# Patient Record
Sex: MALE | URBAN - METROPOLITAN AREA
[De-identification: names, ages, dates, MRNs, and addresses within clinical notes are randomized per-mention and may not be internally consistent; named-entity substitution may affect disease eponyms.]

---

## 2019-12-10 ENCOUNTER — APPOINTMENT (RX ONLY)
Dept: URBAN - METROPOLITAN AREA CLINIC 45 | Facility: CLINIC | Age: 79
Setting detail: DERMATOLOGY
End: 2019-12-10

## 2019-12-10 PROBLEM — D04.21 CARCINOMA IN SITU OF SKIN OF RIGHT EAR AND EXTERNAL AURICULAR CANAL: Status: ACTIVE | Noted: 2019-12-10

## 2019-12-10 PROCEDURE — 17311 MOHS 1 STAGE H/N/HF/G: CPT

## 2019-12-10 PROCEDURE — 13152 CMPLX RPR E/N/E/L 2.6-7.5 CM: CPT

## 2019-12-10 PROCEDURE — ? MOHS SURGERY

## 2019-12-10 NOTE — PROCEDURE: MOHS SURGERY
Stage 14: Additional Anesthesia Type: 1% lidocaine with epinephrine and a 1:10 solution of 8.4% sodium bicarbonate

## 2020-03-18 ENCOUNTER — APPOINTMENT (RX ONLY)
Dept: URBAN - METROPOLITAN AREA CLINIC 45 | Facility: CLINIC | Age: 80
Setting detail: DERMATOLOGY
End: 2020-03-18

## 2020-03-18 PROBLEM — C44.329 SQUAMOUS CELL CARCINOMA OF SKIN OF OTHER PARTS OF FACE: Status: ACTIVE | Noted: 2020-03-18

## 2020-03-18 PROCEDURE — ? MOHS SURGERY

## 2020-03-18 PROCEDURE — 14041 TIS TRNFR F/C/C/M/N/A/G/H/F: CPT

## 2020-03-18 PROCEDURE — 17311 MOHS 1 STAGE H/N/HF/G: CPT

## 2020-03-18 PROCEDURE — 17312 MOHS ADDL STAGE: CPT

## 2022-03-09 ENCOUNTER — APPOINTMENT (RX ONLY)
Dept: URBAN - METROPOLITAN AREA CLINIC 45 | Facility: CLINIC | Age: 82
Setting detail: DERMATOLOGY
End: 2022-03-09

## 2022-03-09 PROBLEM — C44.311 BASAL CELL CARCINOMA OF SKIN OF NOSE: Status: ACTIVE | Noted: 2022-03-09

## 2022-03-09 PROCEDURE — 14061 TIS TRNFR E/N/E/L10.1-30SQCM: CPT

## 2022-03-09 PROCEDURE — 17311 MOHS 1 STAGE H/N/HF/G: CPT

## 2022-03-09 PROCEDURE — ? MOHS SURGERY

## 2022-03-09 NOTE — PROCEDURE: MOHS SURGERY
Addended by: JOHN ESCOBAR on: 7/7/2020 03:39 PM     Modules accepted: Orders     Paramedian Forehead Flap Text: We discussed various closure modalities with the patient, including healing by second intention, primary closure, skin graft and various flaps.  The location and configuration of the defect indicated that a paramedian forehead (interpolation) flap would result in the least disturbance of the position and function of the surrounding anatomic structures, and provide the best result.  The technique, its benefits, alternatives and risks were discussed with the patient.  The patient underwent the procedure as follows: The patient was positioned supine on the operating room table.  The area of the defect and the surrounding skin were anesthetized with buffered 1% lidocaine with epinephrine.  The area was washed with chlorhexidine.  Sterile drapes were applied. \\n\\nA telfa template was made of the defect.  This telfa template was then used to outline the paramedian forehead pedicle flap.  The flap was excised through the skin and subcutaneous tissue down to the layer of the underlying musculature.  The pedicle flap was carefully excised within this deep plane to maintain its blood supply.  The edges of the donor site were undermined.   The donor site was closed in a primary fashion.  The pedicle was then rotated into position and sutured.  Once the tube was sutured into place, adequate blood supply was confirmed with blanching and refill.  The pedicle was then wrapped with xeroform gauze and dressed appropriately with a telfa and gauze bandage to ensure continued blood supply and protect the attached pedicle.  The second stage of the flap (takedown) would occur after sufficient blood supply has been established, at about 3 weeks.

## 2022-03-16 ENCOUNTER — APPOINTMENT (RX ONLY)
Dept: URBAN - METROPOLITAN AREA CLINIC 45 | Facility: CLINIC | Age: 82
Setting detail: DERMATOLOGY
End: 2022-03-16

## 2022-03-16 DIAGNOSIS — Z48.817 ENCOUNTER FOR SURGICAL AFTERCARE FOLLOWING SURGERY ON THE SKIN AND SUBCUTANEOUS TISSUE: ICD-10-CM

## 2022-03-16 PROCEDURE — 99024 POSTOP FOLLOW-UP VISIT: CPT

## 2022-03-16 PROCEDURE — ? POST-OP WOUND CHECK

## 2022-03-16 ASSESSMENT — LOCATION DETAILED DESCRIPTION DERM: LOCATION DETAILED: NASAL TIP

## 2022-03-16 ASSESSMENT — LOCATION ZONE DERM: LOCATION ZONE: NOSE

## 2022-03-16 ASSESSMENT — LOCATION SIMPLE DESCRIPTION DERM: LOCATION SIMPLE: NOSE

## 2022-03-16 NOTE — PROCEDURE: POST-OP WOUND CHECK
Detail Level: Detailed
Add 24064 Cpt? (Important Note: In 2017 The Use Of 61361 Is Being Tracked By Cms To Determine Future Global Period Reimbursement For Global Periods): yes
Wound Dressing Override (Optional): Aquaphor and/or dressing placed as appropriate

## 2022-03-23 ENCOUNTER — APPOINTMENT (RX ONLY)
Dept: URBAN - METROPOLITAN AREA CLINIC 45 | Facility: CLINIC | Age: 82
Setting detail: DERMATOLOGY
End: 2022-03-23

## 2022-03-23 DIAGNOSIS — Z48.817 ENCOUNTER FOR SURGICAL AFTERCARE FOLLOWING SURGERY ON THE SKIN AND SUBCUTANEOUS TISSUE: ICD-10-CM | Status: WELL CONTROLLED

## 2022-03-23 PROBLEM — D04.39 CARCINOMA IN SITU OF SKIN OF OTHER PARTS OF FACE: Status: ACTIVE | Noted: 2022-03-23

## 2022-03-23 PROCEDURE — ? POST-OP WOUND CHECK

## 2022-03-23 PROCEDURE — 99024 POSTOP FOLLOW-UP VISIT: CPT

## 2022-03-23 PROCEDURE — ? MOHS SURGERY

## 2022-03-23 PROCEDURE — 17311 MOHS 1 STAGE H/N/HF/G: CPT | Mod: 79

## 2022-03-23 PROCEDURE — 13132 CMPLX RPR F/C/C/M/N/AX/G/H/F: CPT | Mod: 79

## 2022-03-23 ASSESSMENT — LOCATION SIMPLE DESCRIPTION DERM: LOCATION SIMPLE: NOSE

## 2022-03-23 ASSESSMENT — LOCATION DETAILED DESCRIPTION DERM: LOCATION DETAILED: NASAL TIP

## 2022-03-23 ASSESSMENT — LOCATION ZONE DERM: LOCATION ZONE: NOSE

## 2022-03-23 NOTE — PROCEDURE: POST-OP WOUND CHECK
Add 10546 Cpt? (Important Note: In 2017 The Use Of 56370 Is Being Tracked By Cms To Determine Future Global Period Reimbursement For Global Periods): yes
Detail Level: Detailed
Wound Dressing Override (Optional): Aquaphor and/or dressing placed as appropriate

## 2022-04-12 ENCOUNTER — APPOINTMENT (RX ONLY)
Dept: URBAN - METROPOLITAN AREA CLINIC 45 | Facility: CLINIC | Age: 82
Setting detail: DERMATOLOGY
End: 2022-04-12

## 2022-04-12 PROBLEM — C44.329 SQUAMOUS CELL CARCINOMA OF SKIN OF OTHER PARTS OF FACE: Status: ACTIVE | Noted: 2022-04-12

## 2022-04-12 PROCEDURE — 17311 MOHS 1 STAGE H/N/HF/G: CPT | Mod: 79

## 2022-04-12 PROCEDURE — ? MOHS SURGERY

## 2022-04-12 PROCEDURE — 13132 CMPLX RPR F/C/C/M/N/AX/G/H/F: CPT | Mod: 79

## 2022-04-12 PROCEDURE — ? PRESCRIPTION

## 2022-04-12 PROCEDURE — ? MEDICATION COUNSELING

## 2022-04-12 RX ORDER — DOXYCYCLINE HYCLATE 100 MG/1
1 TABLET, COATED ORAL BID
Qty: 10 | Refills: 0 | Status: ERX | COMMUNITY
Start: 2022-04-12

## 2022-04-12 RX ADMIN — DOXYCYCLINE HYCLATE 1: 100 TABLET, COATED ORAL at 00:00

## 2022-04-12 NOTE — PROCEDURE: MEDICATION COUNSELING
Spoke with patient regarding subtherapeutic dilantin level.  He denies any missed doses.  Also denies any breakthrough seizures or seizure-like activity.  He would not like to increase the dose at this time as it makes him very tired throughout the day.  Advised patient to contact the office if any breakthrough seizures or seizure-like activity occurs.  Patient voiced understanding.   Calcipotriene Pregnancy And Lactation Text: This medication has not been proven safe during pregnancy. It is unknown if this medication is excreted in breast milk.

## 2022-04-12 NOTE — PROCEDURE: MOHS SURGERY
Anticoagulation Summary  As of 2022    INR goal:  1.5-2.5   TTR:  75.0 % (1.9 y)   INR used for dosin.6 (2022)   Warfarin maintenance plan:  5 mg (5 mg x 1) every Tu; 7.5 mg (5 mg x 1.5) all other days   Weekly warfarin total:  50 mg   No change documented:  Shelly Watson RN   Plan last modified:  Shelly Watson RN (2022)   Next INR check:  2022   Target end date:           Anticoagulation Episode Summary     INR check location:      Preferred lab:      Send INR reminders to:  [facility] NURSE MSG POOL    Comments:             Mart-1 - Negative Histology Text: MART-1 staining demonstrates a normal density and pattern of melanocytes along the dermal-epidermal junction. The surgical margins are negative for tumor cells.

## 2022-04-12 NOTE — PROCEDURE: MEDICATION COUNSELING
Xelanne mariez Pregnancy And Lactation Text: This medication is Pregnancy Category D and is not considered safe during pregnancy.  The risk during breast feeding is also uncertain.

## 2022-04-12 NOTE — PROCEDURE: MOHS SURGERY
CPAP Device Patient Instruction    Tio Hussein   1966     LAURIE Ye has ordered a CPAP Device.    Indicated below is who you have approved as your Durable Medical Equipment (DME) provider:      [facility]  Phone: [phone] or [phone]  Address: [address] [place] [address]  Website:  [email or web address]     · The durable medical equipment (DME) company you have chosen as your provider will call you to set up a day and location for you to  your CPAP machine.  You will select a mask, learn how to use the machine and learn how to do a download.  · A download is completed:    1. Modem: Please call your supplier 10 days prior to your appointment.  They will then retrieve the data report from the machine and fax it to our office for review.    · This downloaded report will indicate:  1. How much you are using the CPAP machine   2. If your apnea is controlled  3. If your mask is leaking    Please make sure to keep this DME provider information on hand for any future questions or concerns with your CPAP device.      Please know when your insurance will approve replacement supplies. You will need to call your supplier for new supplies at that time.      FOLLOW UP:   · According to insurance guidelines, a follow up appointment after using the device is required within a certain timeframe. Please make sure to keep that appointment.    · If you forget to have your CPAP data downloaded, be sure to keep your appointment and complete the download as soon as possible.   · Follow up appointments and download reports are needed to monitor your health and sleep apnea.  Insurance companies also request this information for compliance.      If you have any questions, please call my office at [phone].        CPAP / BiPAP Care and Cleaning Instructions    Follow the ’s recommendations.  Do not take your mask apart unless you know how to put it back together.     Mask:  Clean daily with a damp washcloth.  Clean weekly with water and baby shampoo or other mild soap.     Hose/Tubing: Clean weekly with water and baby shampoo or other mild soap.  Hang over a shower carloz to dry.     Humidifier chamber: Fill nightly with distilled water.  Every morning, empty the excess water and let the chamber air dry.  Clean weekly with above.     Air filter: A white filter should be removed weekly and the dust flicked off with your fingers.  Do not wash the white filter.  Replace when unable to flick away dust.  If you have a black foam filter, you can wash that with mild soap and water weekly.      All of the above parts are REPLACEABLE and it is recommended to change them out routinely!  Overtime, dirt and oils from your skin can soften the cushion, making it so it doesn't hold a tight seal anymore with your face.  It also affects hygiene.  This creates air leaks that can affect how effective your CPAP therapy is.  Call the company that provided your CPAP/BiPAP machine for help with ordering. Some parts are replaceable monthly and others every 3-6 months.  Ask, and then channing your calendar.  When due, they will be covered by insurance.      Schedule below is typical for most insurances:   Full-Face Cushion: 1 per month  Disposable Filter: 2 per month   Replaceable Pillow or Cushions: 2 per month   Mask: 1 every 3 months  Tubin every 3 months  Non-Disposable Filter: 1 every 6 months   Headgear: 1 every 6 months  Disposable Heated Humidification Chamber: 1 every 6 months  Chin Strap: 1 every 6 months     If you are having problems using your CPAP/BiPAP device due to discomfort, congestion, air leak, etc. please call the office for help.  Do NOT just stop wearing your device.  It is important for your health!             Nasalis-Muscle-Based Myocutaneous Island Pedicle Flap Text: Using a #15 blade, an incision was made around the donor flap to the level of the nasalis muscle. Wide lateral undermining was then performed in both the subcutaneous plane above the nasalis muscle, and in a submuscular plane just above periosteum. This allowed the formation of a free nasalis muscle axial pedicle (based on the angular artery) which was still attached to the actual cutaneous flap, increasing its mobility and vascular viability. Hemostasis was obtained with pinpoint electrocoagulation. The flap was mobilized and tunnelled into position and the pivotal anchor points positioned and stabilized with buried interrupted sutures. Subcutaneous and dermal tissues were closed in a multilayered fashion with sutures. Tissue redundancies were excised, and the epidermal edges were apposed without significant tension and sutured with sutures.  The primary and secondary sites were closed with subcutaneous, dermal and epidermal sutures.

## 2022-04-12 NOTE — PROCEDURE: MOHS SURGERY
Patient c/o severe pain in my left lower leg. Patient believes its a blood clot. Patient has history of clots. Patient denies any chest pain or shortness of breath. Incidental Superficial Basal Cell Carcinoma Histology Text: Incidentally, a superficial basal cell carcinoma demonstrating hyperchromatic nuclei and peripheral palasading is seen within the epidermis.

## 2022-04-14 ENCOUNTER — APPOINTMENT (RX ONLY)
Dept: URBAN - METROPOLITAN AREA CLINIC 45 | Facility: CLINIC | Age: 82
Setting detail: DERMATOLOGY
End: 2022-04-14

## 2022-04-14 PROBLEM — C44.712 BASAL CELL CARCINOMA OF SKIN OF RIGHT LOWER LIMB, INCLUDING HIP: Status: ACTIVE | Noted: 2022-04-14

## 2022-04-14 PROCEDURE — 13121 CMPLX RPR S/A/L 2.6-7.5 CM: CPT | Mod: 79

## 2022-04-14 PROCEDURE — ? MOHS SURGERY

## 2022-04-14 PROCEDURE — 17313 MOHS 1 STAGE T/A/L: CPT | Mod: 79

## 2022-04-14 NOTE — PROCEDURE: MOHS SURGERY
07-Aug-2019 00:00 Bcc Histology Text: There were aggregates of hyperchromatic basaloid cells present with peripheral palisading and retraction within fibromyxoid stroma.  The area of positive cancer is as marked on the Mohs map.

## 2022-04-14 NOTE — PROCEDURE: MOHS SURGERY
-see above    Ear Star Wedge Flap Text: The defect edges were debeveled with a #15 blade scalpel.  Given the location of the defect and the proximity to free margins (helical rim) an ear star wedge flap was deemed most appropriate.  Using a sterile surgical marker, the appropriate flap was drawn incorporating the defect and placing the expected incisions between the helical rim and antihelix where possible.  The area thus outlined was incised through and through with a #15 scalpel blade.

## 2022-04-19 ENCOUNTER — APPOINTMENT (RX ONLY)
Dept: URBAN - METROPOLITAN AREA CLINIC 45 | Facility: CLINIC | Age: 82
Setting detail: DERMATOLOGY
End: 2022-04-19

## 2022-04-19 PROBLEM — C44.319 BASAL CELL CARCINOMA OF SKIN OF OTHER PARTS OF FACE: Status: ACTIVE | Noted: 2022-04-19

## 2022-04-19 PROCEDURE — 13132 CMPLX RPR F/C/C/M/N/AX/G/H/F: CPT | Mod: 79

## 2022-04-19 PROCEDURE — 17312 MOHS ADDL STAGE: CPT | Mod: 79

## 2022-04-19 PROCEDURE — ? MOHS SURGERY

## 2022-04-19 PROCEDURE — 17311 MOHS 1 STAGE H/N/HF/G: CPT | Mod: 79

## 2022-04-25 ENCOUNTER — APPOINTMENT (RX ONLY)
Dept: URBAN - METROPOLITAN AREA CLINIC 45 | Facility: CLINIC | Age: 82
Setting detail: DERMATOLOGY
End: 2022-04-25

## 2022-04-25 PROBLEM — D04.39 CARCINOMA IN SITU OF SKIN OF OTHER PARTS OF FACE: Status: ACTIVE | Noted: 2022-04-25

## 2022-04-25 PROCEDURE — 17311 MOHS 1 STAGE H/N/HF/G: CPT | Mod: 79

## 2022-04-25 PROCEDURE — ? MOHS SURGERY

## 2022-04-25 PROCEDURE — 13132 CMPLX RPR F/C/C/M/N/AX/G/H/F: CPT | Mod: 79

## 2022-04-25 NOTE — PROCEDURE: MOHS SURGERY
Chuy Hollis Jr. was admitted to [facility] from another hospital via cart accompanied by Other EMS.   Reason for hospitalization is Chest pain and planned nephrectomy on 10/28.   Upon arrival, patient is stable. Patient has history significant for HTN, CKD.  Patient oriented to bed, call light, , room and unit.  Patient provided with the following educational materials upon admission:safety, advanced directives and infection control.   Level of understanding patient verbalized understanding.   Admission orders received at this time.  MD notified of patient arrival.   See Epic documentation for patient individualized nursing care plan.   O-T Advancement Flap Text: We discussed various closure modalities with the patient, including healing by second intention, primary closure, skin graft and various flaps.  The location and configuration of the defect indicated that an O-T advancement flap would result in the least disturbance of the position and function of the surrounding anatomic structures, and provide the best result.  The technique, its benefits, alternatives and risks were discussed with the patient.  The patient underwent the procedure as follows: The patient was positioned supine on the operating room table.  The area of the defect and the surrounding skin were anesthetized with buffered 1% lidocaine with epinephrine.  The area was washed with chlorhexidine.  Sterile drapes were applied. \\n\\nThe wound edges were debeveled and extensively undermined.  A O-T advancement flap was designed, incised, and elevated.  Hemostasis was achieved with spot electrodesiccation.  The flap was advanced into position to cover the primary defect and a key suture was used to secure the flap into place.  Additional buried interrupted sutures were used to close the arms of the flap by the rule of halves to share out the unequal lengths.  The standing cones so created by the design of the flap was removed to fat by triangulation.  Final cutaneous approximation was achieved.  The closure was manually tested and found to be sound for strength and hemostasis.

## 2022-04-30 ENCOUNTER — TELEPHONE ENCOUNTER (OUTPATIENT)
Dept: URBAN - METROPOLITAN AREA CLINIC 121 | Facility: CLINIC | Age: 82
End: 2022-04-30

## 2022-04-30 RX ORDER — LOSARTAN POTASSIUM 50 MG/1
QD TABLET, FILM COATED ORAL
OUTPATIENT
Start: 2013-12-06

## 2022-04-30 RX ORDER — GLYBURIDE 5 MG/1
QD TABLET ORAL
OUTPATIENT
Start: 2013-12-06 | End: 2014-09-24

## 2022-04-30 RX ORDER — PANTOPRAZOLE SODIUM 40 MG/1
1 TABLET PO BID TABLET, DELAYED RELEASE ORAL
OUTPATIENT
Start: 2013-12-06

## 2022-04-30 RX ORDER — SUCRALFATE 1 G/10ML
QID SUSPENSION ORAL
OUTPATIENT
Start: 2013-12-06 | End: 2014-09-24

## 2022-04-30 RX ORDER — MULTIVITAMIN
TABLET ORAL
OUTPATIENT
Start: 2006-02-23

## 2022-04-30 RX ORDER — GLYBURIDE AND METFORMIN HYDROCHLORIDE 2.5; 5 MG/1; MG/1
TABLET, FILM COATED ORAL
OUTPATIENT
Start: 2006-02-23

## 2022-04-30 RX ORDER — METFORMIN HCL 500 MG/1
BID TABLET ORAL
OUTPATIENT
Start: 2013-12-06

## 2022-04-30 RX ORDER — MULTIVITAMIN
TABLET ORAL
OUTPATIENT
Start: 2006-02-23 | End: 2013-12-06

## 2022-04-30 RX ORDER — ASPIRIN 81 MG/1
TABLET ORAL
OUTPATIENT
Start: 2006-02-23 | End: 2013-12-06

## 2022-04-30 RX ORDER — SUCRALFATE 1 G/10ML
QID SUSPENSION ORAL
OUTPATIENT
Start: 2013-12-06

## 2022-04-30 RX ORDER — PANTOPRAZOLE SODIUM 40 MG/1
1 TABLET PO BID TABLET, DELAYED RELEASE ORAL
OUTPATIENT
Start: 2013-12-06 | End: 2014-09-24

## 2022-04-30 RX ORDER — ASPIRIN 81 MG/1
TABLET ORAL
OUTPATIENT
Start: 2006-02-23

## 2022-04-30 RX ORDER — GLYBURIDE 5 MG/1
QD TABLET ORAL
OUTPATIENT
Start: 2013-12-06

## 2022-05-01 ENCOUNTER — TELEPHONE ENCOUNTER (OUTPATIENT)
Dept: URBAN - METROPOLITAN AREA CLINIC 121 | Facility: CLINIC | Age: 82
End: 2022-05-01

## 2022-05-01 RX ORDER — METFORMIN HCL 500 MG/1
BID TABLET ORAL
Status: ACTIVE | COMMUNITY
Start: 2014-09-24

## 2022-05-01 RX ORDER — ASPIRIN 81 MG
TABLET, DELAYED RELEASE (ENTERIC COATED) ORAL
Status: ACTIVE | COMMUNITY
Start: 2017-06-26

## 2022-05-01 RX ORDER — OMEPRAZOLE MAGNESIUM 20.6 MG/1
QD CAPSULE, DELAYED RELEASE ORAL
Status: ACTIVE | COMMUNITY
Start: 2014-09-24

## 2022-05-01 RX ORDER — LOSARTAN POTASSIUM 100 MG/1
QD TABLET, FILM COATED ORAL
Status: ACTIVE | COMMUNITY
Start: 2014-09-24

## 2022-05-01 RX ORDER — MULTIVIT-MINERALS/FA/LYCOPENE 0.4 MG-6
TABLET ORAL
Status: ACTIVE | COMMUNITY
Start: 2014-09-24

## 2022-05-01 RX ORDER — NIFEDIPINE 90 MG/1
QD TABLET, EXTENDED RELEASE ORAL
Status: ACTIVE | COMMUNITY
Start: 2014-09-24

## 2022-05-02 ENCOUNTER — APPOINTMENT (RX ONLY)
Dept: URBAN - METROPOLITAN AREA CLINIC 45 | Facility: CLINIC | Age: 82
Setting detail: DERMATOLOGY
End: 2022-05-02

## 2022-05-02 DIAGNOSIS — Z48.817 ENCOUNTER FOR SURGICAL AFTERCARE FOLLOWING SURGERY ON THE SKIN AND SUBCUTANEOUS TISSUE: ICD-10-CM

## 2022-05-02 DIAGNOSIS — L90.5 SCAR CONDITIONS AND FIBROSIS OF SKIN: ICD-10-CM

## 2022-05-02 PROCEDURE — ? POST-OP WOUND CHECK

## 2022-05-02 PROCEDURE — 99024 POSTOP FOLLOW-UP VISIT: CPT

## 2022-05-02 ASSESSMENT — LOCATION SIMPLE DESCRIPTION DERM
LOCATION SIMPLE: RIGHT FOREHEAD
LOCATION SIMPLE: RIGHT PRETIBIAL REGION

## 2022-05-02 ASSESSMENT — LOCATION ZONE DERM
LOCATION ZONE: LEG
LOCATION ZONE: FACE

## 2022-05-02 ASSESSMENT — LOCATION DETAILED DESCRIPTION DERM
LOCATION DETAILED: RIGHT SUPERIOR FOREHEAD
LOCATION DETAILED: RIGHT PROXIMAL PRETIBIAL REGION

## 2022-05-02 NOTE — PROCEDURE: POST-OP WOUND CHECK
Add 28049 Cpt? (Important Note: In 2017 The Use Of 33145 Is Being Tracked By Cms To Determine Future Global Period Reimbursement For Global Periods): yes
Detail Level: Detailed

## 2022-11-15 ENCOUNTER — DASHBOARD ENCOUNTERS (OUTPATIENT)
Age: 82
End: 2022-11-15

## 2022-11-15 ENCOUNTER — OFFICE VISIT (OUTPATIENT)
Dept: URBAN - METROPOLITAN AREA CLINIC 27 | Facility: CLINIC | Age: 82
End: 2022-11-15
Payer: MEDICARE

## 2022-11-15 DIAGNOSIS — K63.5 POLYP OF COLON: ICD-10-CM

## 2022-11-15 DIAGNOSIS — E11.9 TYPE 2 DIABETES MELLITUS WITHOUT COMPLICATIONS: ICD-10-CM

## 2022-11-15 DIAGNOSIS — I10 ESSENTIAL (PRIMARY) HYPERTENSION: ICD-10-CM

## 2022-11-15 PROBLEM — 68496003 POLYP OF COLON: Status: ACTIVE | Noted: 2022-11-15

## 2022-11-15 PROCEDURE — 99203 OFFICE O/P NEW LOW 30 MIN: CPT | Performed by: INTERNAL MEDICINE

## 2022-11-15 PROCEDURE — 99242 OFF/OP CONSLTJ NEW/EST SF 20: CPT | Performed by: INTERNAL MEDICINE

## 2022-11-15 RX ORDER — MULTIVIT-MINERALS/FA/LYCOPENE 0.4 MG-6
TABLET ORAL
Status: ACTIVE | COMMUNITY
Start: 2014-09-24

## 2022-11-15 RX ORDER — ASPIRIN 81 MG
TABLET, DELAYED RELEASE (ENTERIC COATED) ORAL
Status: ACTIVE | COMMUNITY
Start: 2017-06-26

## 2022-11-15 RX ORDER — LOSARTAN POTASSIUM 100 MG/1
QD TABLET, FILM COATED ORAL
Status: ACTIVE | COMMUNITY
Start: 2014-09-24

## 2022-11-15 RX ORDER — METFORMIN HCL 500 MG/1
BID TABLET ORAL
Status: ACTIVE | COMMUNITY
Start: 2014-09-24

## 2022-11-15 RX ORDER — NIFEDIPINE 90 MG/1
QD TABLET, EXTENDED RELEASE ORAL
Status: ACTIVE | COMMUNITY
Start: 2014-09-24

## 2022-11-15 RX ORDER — OMEPRAZOLE MAGNESIUM 20.6 MG/1
QD CAPSULE, DELAYED RELEASE ORAL
Status: ACTIVE | COMMUNITY
Start: 2014-09-24

## 2022-11-15 NOTE — HPI-TODAY'S VISIT:
This is a 82-year-old male seen in consultation today for colon cancer screening evaluation.  He has a history of sessile serrated adenoma and tubular adenomas.  Last colonoscopy in 2017 with benign polyps.  He has multiple myeloma.  He has MARCELO with a creatinine of 1.9.  He is followed by nephrology and oncology.  He wants to discuss follow-up and surveillance colonoscopy but has no current GI issues.  No worsening anemia or other changes

## 2023-03-28 NOTE — PROCEDURE: MOHS SURGERY
[facility] Body Location Override (Optional - Billing Will Still Be Based On Selected Body Map Location If Applicable): right superior posterior helix

## 2023-04-21 NOTE — PROCEDURE: MOHS SURGERY
Never Mid-Level Procedure Text (A): After obtaining clear surgical margins the patient was sent to a mid-level provider for surgical repair.  The patient understands they will receive post-surgical care and follow-up from the mid-level provider.

## 2023-12-04 NOTE — PROCEDURE: MOHS SURGERY
----- Message from Damion Rae MD sent at 12/4/2023  4:35 PM CST -----  Ultrasound reviewed the liver is normal in size, gallbladder is normal ; no evidence of any biliary duct dilation.  Follow-up for repeat blood testing upcoming as previously discussed.   Secondary Defect Length In Cm (Required For Flaps): 3

## 2024-03-11 NOTE — PROCEDURE: MOHS SURGERY
Provider: KIERAN    Caller: PATIENT    Phone Number: [phone]    Reason for Call: PATIENT STATES THAT SHE HAS HAD NUMBNESS IN HER LEFT THUMB AND INDEX FINGER FOR THE LAST 3 DAYS. SHE ALSO STATES SHE HAS BEEN HAVING CONSTIPATION. SHE IS ALSO ASKING WHAT SHE IS SUPPOSED TO DO WITH HER ARM UNTIL SHE IS SEEN ON 03/18/24. PLEASE CALL HER TO DISCUSS.       Dressing (No Sutures): dry sterile dressing

## 2024-11-06 NOTE — PROCEDURE: MOHS SURGERY
Patient is in the lateral left side position. Information: Selecting Yes will display possible errors in your note based on the variables you have selected. This validation is only offered as a suggestion for you. PLEASE NOTE THAT THE VALIDATION TEXT WILL BE REMOVED WHEN YOU FINALIZE YOUR NOTE. IF YOU WANT TO FAX A PRELIMINARY NOTE YOU WILL NEED TO TOGGLE THIS TO 'NO' IF YOU DO NOT WANT IT IN YOUR FAXED NOTE.

## 2025-04-20 NOTE — PROCEDURE: MOHS SURGERY
As above     Adjacent Tissue Transfer Text: The defect edges were debeveled with a #15 scalpel blade.  Given the location of the defect and the proximity to free margins an adjacent tissue transfer was deemed most appropriate.  Using a sterile surgical marker, an appropriate flap was drawn incorporating the defect and placing the expected incisions within the relaxed skin tension lines where possible.    The area thus outlined was incised deep to adipose tissue with a #15 scalpel blade.  The skin margins were undermined to an appropriate distance in all directions utilizing iris scissors.

## 2025-07-16 NOTE — PROCEDURE: MEDICATION COUNSELING
show Benzoyl Peroxide Pregnancy And Lactation Text: This medication is Pregnancy Category C. It is unknown if benzoyl peroxide is excreted in breast milk.